# Patient Record
Sex: FEMALE | Race: WHITE | NOT HISPANIC OR LATINO | Employment: FULL TIME | URBAN - METROPOLITAN AREA
[De-identification: names, ages, dates, MRNs, and addresses within clinical notes are randomized per-mention and may not be internally consistent; named-entity substitution may affect disease eponyms.]

---

## 2020-10-06 ENCOUNTER — VIRTUAL VISIT (OUTPATIENT)
Dept: URGENT CARE | Facility: CLINIC | Age: 25
End: 2020-10-06
Payer: COMMERCIAL

## 2020-10-06 DIAGNOSIS — R05.9 COUGH: Primary | ICD-10-CM

## 2020-10-06 PROCEDURE — 99203 OFFICE O/P NEW LOW 30 MIN: CPT | Mod: 95 | Performed by: EMERGENCY MEDICINE

## 2020-10-06 NOTE — PROGRESS NOTES
HPI: Patient is a 25-year-old female who has a 2-day history of headache, cough, sinus congestion, chest tightness, and mild shortness of breath.  No fever.  No obvious cold exposure.  Work requires her to be tested for COVID prior to returning to work.  She does feel somewhat short of breath when she climbs stairs.    ROS: See HPI otherwise normal.    Not on File   No current outpatient medications on file.         PE: No acute distress and nondyspneic sounding on the phone.        TREATMENT: None      ASSESSMENT: Multiple symptoms suspicious of COVID infection in a 25-year-old female that is in no acute distress at this time.      DIAGNOSIS: URI      PLAN: Mitzi PCR ordered.  Testing team to follow.  Urgent care reconnect with phone appointment if worsening symptoms or concerns.    Time: 10 minutes

## 2022-03-27 ENCOUNTER — HEALTH MAINTENANCE LETTER (OUTPATIENT)
Age: 27
End: 2022-03-27

## 2022-09-24 ENCOUNTER — HEALTH MAINTENANCE LETTER (OUTPATIENT)
Age: 27
End: 2022-09-24

## 2023-05-08 ENCOUNTER — HEALTH MAINTENANCE LETTER (OUTPATIENT)
Age: 28
End: 2023-05-08